# Patient Record
Sex: FEMALE | Race: WHITE | NOT HISPANIC OR LATINO | URBAN - METROPOLITAN AREA
[De-identification: names, ages, dates, MRNs, and addresses within clinical notes are randomized per-mention and may not be internally consistent; named-entity substitution may affect disease eponyms.]

---

## 2019-04-08 ENCOUNTER — EMERGENCY (EMERGENCY)
Facility: HOSPITAL | Age: 54
LOS: 0 days | Discharge: HOME | End: 2019-04-08
Attending: EMERGENCY MEDICINE | Admitting: EMERGENCY MEDICINE
Payer: COMMERCIAL

## 2019-04-08 VITALS
RESPIRATION RATE: 20 BRPM | HEART RATE: 85 BPM | DIASTOLIC BLOOD PRESSURE: 81 MMHG | TEMPERATURE: 98 F | SYSTOLIC BLOOD PRESSURE: 156 MMHG | OXYGEN SATURATION: 98 %

## 2019-04-08 DIAGNOSIS — R03.0 ELEVATED BLOOD-PRESSURE READING, WITHOUT DIAGNOSIS OF HYPERTENSION: ICD-10-CM

## 2019-04-08 DIAGNOSIS — E03.9 HYPOTHYROIDISM, UNSPECIFIED: ICD-10-CM

## 2019-04-08 DIAGNOSIS — Z87.891 PERSONAL HISTORY OF NICOTINE DEPENDENCE: ICD-10-CM

## 2019-04-08 DIAGNOSIS — R07.89 OTHER CHEST PAIN: ICD-10-CM

## 2019-04-08 DIAGNOSIS — R21 RASH AND OTHER NONSPECIFIC SKIN ERUPTION: ICD-10-CM

## 2019-04-08 PROCEDURE — 99284 EMERGENCY DEPT VISIT MOD MDM: CPT

## 2019-04-08 PROCEDURE — 93010 ELECTROCARDIOGRAM REPORT: CPT

## 2019-04-08 NOTE — ED PROVIDER NOTE - NS ED ROS FT
Constitutional: (-) fever  Eyes/ENT: (-) blurry vision, (-) epistaxis  Cardiovascular: (-) chest pain, (-) syncope, (-) no change inexercise tolerance  Respiratory: (-) cough, (-) shortness of breath  Gastrointestinal: (-) vomiting, (-) diarrhea, (-) abdominal or flank pain  : (-) dysuria or frequency  Musculoskeletal: (-) neck pain, (-) back pain, (-) new  joint pain ( + chronic knee pain)  Integumentary: (+)chronic rash, (-) edema  Neurological: (-) headache, (-) altered mental status, (-) focal weakness or paresthesias  Allergic/Immunologic: (-) pruritus

## 2019-04-08 NOTE — ED PROVIDER NOTE - PHYSICAL EXAMINATION
Vital Signs: I have reviewed the initial vital signs.  Constitutional: well-nourished, appears stated age, no acute distress  HEENT: PERRL, mmm, pink conjunctivae  Cardiovascular: regular rate, regular rhythm, well-perfused extremities, distal pulses intact  Respiratory: unlabored respiratory effort, clear to auscultation bilaterally, speaking full sentences  Gastrointestinal: soft, obese, non-tender abdomen, no pulsatile mass, no CVA ttp  Musculoskeletal: supple neck, no lower extremity edema or calf ttp  Integumentary: warm, dry, + psoriatic rash on  legs  Neurologic: awake, alert, cranial nerves II-XII grossly intact, extremities’ motor and sensory functions grossly intact  Psychiatric: appropriate mood, appropriate affect

## 2019-04-08 NOTE — ED PROVIDER NOTE - OBJECTIVE STATEMENT
54 yo female h/o psoriatic arthritis, hypothyroidism, borderline BP for years here for evaluation of elevated BP today.  Patient states she is under a lot of stress lately, went today to a pharmacy and measures her BP, but says that the BP cuff did not fit her large arms ,  She decided to come her for BP check.  Denies any complaints, says that she occasional feels" fluttering in her chest ", always in the supine position, never on ambulation ( this has been present for months).  Saw her PMD end on March , has blood work done, still waiting for results ( her PMD work in [place], she lives in [facility]).

## 2019-04-08 NOTE — ED ADULT NURSE NOTE - CHPI ED NUR SYMPTOMS NEG
no blurred vision/no confusion/no fever/no loss of consciousness/no change in level of consciousness/no numbness/no vomiting/no dizziness/no weakness

## 2019-04-08 NOTE — ED ADULT NURSE NOTE - OBJECTIVE STATEMENT
pt c/o high pt at home . pt checked her bp at home and it was in the 200s. on arrival pt bp normal. pt c/o high pt at home . pt checked her bp at home and it was in the 200s. on arrival pt bp within normal limits. pt [place] sob/chest pain. denies headache/dizzines/numbness.tingling. denies n/v/d.

## 2019-04-08 NOTE — ED PROVIDER NOTE - CLINICAL SUMMARY MEDICAL DECISION MAKING FREE TEXT BOX
52 yo female with asymptomatic elevation of blood pressure.  ECG WNL, advised to follow up with her PMD whom she will see in 4 days.